# Patient Record
Sex: MALE | Race: WHITE | Employment: STUDENT | URBAN - METROPOLITAN AREA
[De-identification: names, ages, dates, MRNs, and addresses within clinical notes are randomized per-mention and may not be internally consistent; named-entity substitution may affect disease eponyms.]

---
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
## 2019-10-02 ENCOUNTER — HEALTH MAINTENANCE LETTER (OUTPATIENT)
Age: 19
End: 2019-10-02

## 2021-01-15 ENCOUNTER — HEALTH MAINTENANCE LETTER (OUTPATIENT)
Age: 21
End: 2021-01-15

## 2021-09-05 ENCOUNTER — HEALTH MAINTENANCE LETTER (OUTPATIENT)
Age: 21
End: 2021-09-05

## 2022-02-20 ENCOUNTER — HEALTH MAINTENANCE LETTER (OUTPATIENT)
Age: 22
End: 2022-02-20

## 2022-06-10 NOTE — TELEPHONE ENCOUNTER
Type of outreach:  Phone, spoke to patient.  Notified pt that he is overdue for office visit for PHQ-9  Health Maintenance Due   Topic Date Due     HPV IMMUNIZATION (2 of 3 - Male 3 Dose Series) 10/22/2015     ASTHMA CONTROL TEST Q6 MOS (NO INBASKET)  04/11/2017     PHQ-9 Q6 MONTHS (NO INBASKET)  04/11/2017     He will call back to schedule.    Elisa Parrish MA       08-Feb-2022

## 2022-10-23 ENCOUNTER — HEALTH MAINTENANCE LETTER (OUTPATIENT)
Age: 22
End: 2022-10-23

## 2023-04-01 ENCOUNTER — HEALTH MAINTENANCE LETTER (OUTPATIENT)
Age: 23
End: 2023-04-01